# Patient Record
Sex: FEMALE | Race: BLACK OR AFRICAN AMERICAN | NOT HISPANIC OR LATINO | URBAN - METROPOLITAN AREA
[De-identification: names, ages, dates, MRNs, and addresses within clinical notes are randomized per-mention and may not be internally consistent; named-entity substitution may affect disease eponyms.]

---

## 2017-09-28 ENCOUNTER — OUTPATIENT (OUTPATIENT)
Dept: OUTPATIENT SERVICES | Facility: HOSPITAL | Age: 47
LOS: 1 days | Discharge: HOME | End: 2017-09-28

## 2017-09-28 DIAGNOSIS — Z01.818 ENCOUNTER FOR OTHER PREPROCEDURAL EXAMINATION: ICD-10-CM

## 2017-09-28 DIAGNOSIS — S13.101A DISLOCATION OF UNSPECIFIED CERVICAL VERTEBRAE, INITIAL ENCOUNTER: ICD-10-CM

## 2017-10-03 DIAGNOSIS — E11.9 TYPE 2 DIABETES MELLITUS WITHOUT COMPLICATIONS: ICD-10-CM

## 2017-10-03 DIAGNOSIS — E66.9 OBESITY, UNSPECIFIED: ICD-10-CM

## 2017-10-03 DIAGNOSIS — I10 ESSENTIAL (PRIMARY) HYPERTENSION: ICD-10-CM

## 2017-10-12 ENCOUNTER — INPATIENT (INPATIENT)
Facility: HOSPITAL | Age: 47
LOS: 2 days | Discharge: HOME | End: 2017-10-15
Attending: NEUROLOGICAL SURGERY

## 2017-10-12 DIAGNOSIS — S13.101A DISLOCATION OF UNSPECIFIED CERVICAL VERTEBRAE, INITIAL ENCOUNTER: ICD-10-CM

## 2017-10-19 DIAGNOSIS — Z90.49 ACQUIRED ABSENCE OF OTHER SPECIFIED PARTS OF DIGESTIVE TRACT: ICD-10-CM

## 2017-10-19 DIAGNOSIS — M50.123 CERVICAL DISC DISORDER AT C6-C7 LEVEL WITH RADICULOPATHY: ICD-10-CM

## 2017-10-19 DIAGNOSIS — M50.122 CERVICAL DISC DISORDER AT C5-C6 LEVEL WITH RADICULOPATHY: ICD-10-CM

## 2017-10-19 DIAGNOSIS — I10 ESSENTIAL (PRIMARY) HYPERTENSION: ICD-10-CM

## 2017-10-19 DIAGNOSIS — F03.90 UNSPECIFIED DEMENTIA, UNSPECIFIED SEVERITY, WITHOUT BEHAVIORAL DISTURBANCE, PSYCHOTIC DISTURBANCE, MOOD DISTURBANCE, AND ANXIETY: ICD-10-CM

## 2017-10-19 DIAGNOSIS — E11.9 TYPE 2 DIABETES MELLITUS WITHOUT COMPLICATIONS: ICD-10-CM

## 2018-07-31 PROBLEM — Z00.00 ENCOUNTER FOR PREVENTIVE HEALTH EXAMINATION: Status: ACTIVE | Noted: 2018-07-31

## 2018-08-06 ENCOUNTER — APPOINTMENT (OUTPATIENT)
Dept: OBGYN | Facility: CLINIC | Age: 48
End: 2018-08-06

## 2018-08-06 VITALS
SYSTOLIC BLOOD PRESSURE: 180 MMHG | DIASTOLIC BLOOD PRESSURE: 120 MMHG | HEIGHT: 62 IN | BODY MASS INDEX: 38.28 KG/M2 | WEIGHT: 208 LBS

## 2018-08-06 DIAGNOSIS — E66.9 OBESITY, UNSPECIFIED: ICD-10-CM

## 2018-08-06 DIAGNOSIS — R73.03 PREDIABETES.: ICD-10-CM

## 2018-08-06 DIAGNOSIS — Z78.9 OTHER SPECIFIED HEALTH STATUS: ICD-10-CM

## 2018-08-06 DIAGNOSIS — I10 ESSENTIAL (PRIMARY) HYPERTENSION: ICD-10-CM

## 2018-08-06 RX ORDER — HYDROCHLOROTHIAZIDE 12.5 MG/1
TABLET ORAL
Refills: 0 | Status: ACTIVE | COMMUNITY

## 2018-10-04 ENCOUNTER — EMERGENCY (EMERGENCY)
Facility: HOSPITAL | Age: 48
LOS: 0 days | Discharge: HOME | End: 2018-10-04
Attending: EMERGENCY MEDICINE | Admitting: EMERGENCY MEDICINE

## 2018-10-04 VITALS
SYSTOLIC BLOOD PRESSURE: 166 MMHG | TEMPERATURE: 98 F | HEIGHT: 67 IN | DIASTOLIC BLOOD PRESSURE: 90 MMHG | RESPIRATION RATE: 19 BRPM | WEIGHT: 210.98 LBS | OXYGEN SATURATION: 97 % | HEART RATE: 82 BPM

## 2018-10-04 DIAGNOSIS — M54.2 CERVICALGIA: ICD-10-CM

## 2018-10-04 RX ORDER — KETOROLAC TROMETHAMINE 30 MG/ML
60 SYRINGE (ML) INJECTION ONCE
Qty: 0 | Refills: 0 | Status: DISCONTINUED | OUTPATIENT
Start: 2018-10-04 | End: 2018-10-04

## 2018-10-04 RX ORDER — METHOCARBAMOL 500 MG/1
2 TABLET, FILM COATED ORAL
Qty: 16 | Refills: 0 | OUTPATIENT
Start: 2018-10-04 | End: 2018-10-05

## 2018-10-04 RX ORDER — METHOCARBAMOL 500 MG/1
750 TABLET, FILM COATED ORAL ONCE
Qty: 0 | Refills: 0 | Status: COMPLETED | OUTPATIENT
Start: 2018-10-04 | End: 2018-10-04

## 2018-10-04 RX ADMIN — METHOCARBAMOL 750 MILLIGRAM(S): 500 TABLET, FILM COATED ORAL at 06:02

## 2018-10-04 RX ADMIN — Medication 60 MILLIGRAM(S): at 06:01

## 2018-10-04 NOTE — ED PROVIDER NOTE - OBJECTIVE STATEMENT
49yo f no sig pmhx presents CC L sided neck pain, non radiating, x 2 days which began at work, worse with movement. no mid line pain, no weakness, numbness, tingling.

## 2018-10-04 NOTE — ED PROVIDER NOTE - NS ED ROS FT
General: No fevers, chills, nausea, vomiting  Eyes:  No visual changes, eye pain or discharge.  ENMT:  No hearing changes, pain,   Cardiac:  No chest pain, SOB or edema.  Respiratory:  No cough or respiratory distress.   GI:  No nausea, vomiting, diarrhea  :  No dysuria, frequency or burning.  MS:  No back pain.  Neuro:  No headache or weakness.  No LOC.  Skin:  No skin rash.   Endocrine: No history of thyroid disease

## 2018-10-04 NOTE — ED PROVIDER NOTE - PHYSICAL EXAMINATION
CONSTITUTIONAL: Well-developed; well-nourished; in no acute distress.   SKIN: warm, dry  HEAD: Normocephalic; atraumatic.  EYES: PERRL, EOMI, no conjunctival erythema  ENT: No nasal discharge; airway clear, mucous membranes moist  NECK: Supple; FROM, no midline tenderness, L scalene TTP, L sided pain worse with neck flexion and sidebending to L  CARD: +S1, S2 no murmurs, gallops, or rubs. Regular rate and rhythm. radial 2+ b/l and equal  RESP: No wheezes, rales or rhonchi. CTABL  EXT: moves all extremities, ambulates wo assistance No clubbing, cyanosis or edema.   NEURO: Alert, oriented, grossly unremarkable, no focal deficits, cn ii-xii grossly intact, ue strength and sensation grossly normal   PSYCH: Cooperative, appropriate.

## 2018-10-04 NOTE — ED ADULT NURSE NOTE - CHPI ED NUR SYMPTOMS NEG
no numbness/no fever/no back pain/no bruising/no stiffness/no tingling/no weakness/no deformity/no abrasion/no difficulty bearing weight

## 2018-10-04 NOTE — ED PROVIDER NOTE - ATTENDING CONTRIBUTION TO CARE
I personally evaluated the patient. I reviewed the Resident’s or Physician Assistant’s note (as assigned above), and agree with the findings and plan except as documented in my note.    49 y/o f no sig pmhx presents with cc of L sided neck pain, non radiating, x 2 days which began at work, worse with head movement. no mid line pain, no weakness, numbness, tingling. No fevers, chills. No nausea, vomiting. No back pain.     Exam: ttp over left sternoclidomastoid area. Otehrwise neck is supple. No midline ttp. Neg Ran and Monster. Plan: NSADIs, return precautions given.

## 2019-09-25 ENCOUNTER — APPOINTMENT (OUTPATIENT)
Dept: OBGYN | Facility: CLINIC | Age: 49
End: 2019-09-25

## 2020-07-22 ENCOUNTER — APPOINTMENT (OUTPATIENT)
Dept: OBGYN | Facility: CLINIC | Age: 50
End: 2020-07-22
Payer: COMMERCIAL

## 2020-07-22 VITALS
HEIGHT: 67 IN | DIASTOLIC BLOOD PRESSURE: 86 MMHG | WEIGHT: 208 LBS | BODY MASS INDEX: 32.65 KG/M2 | SYSTOLIC BLOOD PRESSURE: 133 MMHG

## 2020-07-22 DIAGNOSIS — N95.1 MENOPAUSAL AND FEMALE CLIMACTERIC STATES: ICD-10-CM

## 2020-07-22 PROCEDURE — 99396 PREV VISIT EST AGE 40-64: CPT

## 2020-07-22 NOTE — HISTORY OF PRESENT ILLNESS
[Good] : being in good health [Last Mammogram ___] : Last Mammogram was [unfilled] [Last Pap ___] : Last cervical pap smear was [unfilled] [Contraception] : does not use contraception [Perimenopausal] : is perimenopausal [Menstrual Problems] : reports normal menses

## 2020-07-22 NOTE — PHYSICAL EXAM
[Awake] : awake [Mass] : no breast mass [Acute Distress] : no acute distress [Alert] : alert [Nipple Discharge] : no nipple discharge [Soft] : soft [No Lesions] : no genitalia lesions [Distended] : not distended [Tender] : non tender [Normal] : uterus [Labia Majora] : labia major [Pink Rugae] : pink rugae [No Bleeding] : there was no active vaginal bleeding [Pap Obtained] : a Pap smear was performed [Normal Position] : in a normal position [Uterine Adnexae] : were not tender and not enlarged

## 2020-07-25 LAB — HPV HIGH+LOW RISK DNA PNL CVX: NOT DETECTED

## 2021-07-29 ENCOUNTER — APPOINTMENT (OUTPATIENT)
Dept: OBGYN | Facility: CLINIC | Age: 51
End: 2021-07-29
Payer: COMMERCIAL

## 2021-07-29 VITALS — HEIGHT: 67 IN | BODY MASS INDEX: 31.55 KG/M2 | WEIGHT: 201 LBS

## 2021-07-29 DIAGNOSIS — Z01.419 ENCOUNTER FOR GYNECOLOGICAL EXAMINATION (GENERAL) (ROUTINE) W/OUT ABNORMAL FINDINGS: ICD-10-CM

## 2021-07-29 PROCEDURE — 99396 PREV VISIT EST AGE 40-64: CPT

## 2021-07-29 NOTE — HISTORY OF PRESENT ILLNESS
[FreeTextEntry1] : 52 yo P6 for annual exam. Denies any complaints at this time. Reports periods are increasingly infrequent, LMP 2/2021. Reports they are q 6 months x last 2 years. \par Last mammogram 2020: birads 2, last pap smear 2020: NILM

## 2021-07-29 NOTE — DISCUSSION/SUMMARY
[FreeTextEntry1] : 50 yo for annual exam, perimenopasual\par - f/u pap and HPV\par - f/u mammogram and breast US

## 2021-08-04 LAB — HPV HIGH+LOW RISK DNA PNL CVX: NOT DETECTED

## 2021-08-07 LAB — CYTOLOGY CVX/VAG DOC THIN PREP: NORMAL

## 2023-02-08 ENCOUNTER — APPOINTMENT (OUTPATIENT)
Dept: OBGYN | Facility: CLINIC | Age: 53
End: 2023-02-08
Payer: COMMERCIAL

## 2023-02-08 ENCOUNTER — NON-APPOINTMENT (OUTPATIENT)
Age: 53
End: 2023-02-08

## 2023-02-08 VITALS
SYSTOLIC BLOOD PRESSURE: 138 MMHG | HEIGHT: 67 IN | BODY MASS INDEX: 32.65 KG/M2 | DIASTOLIC BLOOD PRESSURE: 80 MMHG | WEIGHT: 208 LBS

## 2023-02-08 DIAGNOSIS — Z01.419 ENCOUNTER FOR GYNECOLOGICAL EXAMINATION (GENERAL) (ROUTINE) W/OUT ABNORMAL FINDINGS: ICD-10-CM

## 2023-02-08 PROCEDURE — 99396 PREV VISIT EST AGE 40-64: CPT

## 2023-02-08 NOTE — DISCUSSION/SUMMARY
[FreeTextEntry1] : 53 yo for [employer]\par -f/u pap and HPV\par -referrl given for GI for colonoscopy scheduling\par - referral given for mammogram

## 2023-02-08 NOTE — HISTORY OF PRESENT ILLNESS
[FreeTextEntry1] : 53 yo P6 presents for annual exam. Denies any complaints. Perimenopausal, LMP 8/2022, prior to that was 1/2022. \par Denies any pain, discharge, bowel or bladder complaints. \par Last mammogram 1 year ago, normal per patient\par She has never had a colonoscopy\par

## 2023-02-13 LAB — HPV HIGH+LOW RISK DNA PNL CVX: NOT DETECTED

## 2023-06-01 LAB — CYTOLOGY CVX/VAG DOC THIN PREP: NORMAL

## 2024-09-04 ENCOUNTER — APPOINTMENT (OUTPATIENT)
Dept: OBGYN | Facility: CLINIC | Age: 54
End: 2024-09-04